# Patient Record
(demographics unavailable — no encounter records)

---

## 2017-08-22 NOTE — [facility]
PQRS Compliance Statement:

 

One or more of the following individualized dose reduction techniques were

utilized for this examination:  

1. Automated exposure control  

2. Adjustment of the mA and/or kV according to patient size  

3. Use of iterative reconstruction technique

 

 

CT MAXILLOFACIAL WO CONTRAST

 

Clinical Indication: Acute sinusitis, congestion, headache, nasal surgery.

 

Comparison: None.

 

TECHNIQUE: Helical CT imaging of the paranasal sinuses from the skull base

to skull vertex is performed without IV contrast using [employer] protocol.

 

Findings: 

The paranasal sinuses are clear. The mastoid air cells are aerated.

 

The bony nasal septum is mostly midline. The ostiomeatal complexes are 

patent.

 

Visualized brain without obvious midline shift or mass effect, study not 

protocoled for its evaluation. The globes and orbits are intact.

 

IMPRESSION:

Paranasal sinuses are clear.

 

Electronically signed by: Allan Sen MD (8/22/2017 11:33 AM) [id number]

## 2017-09-12 NOTE — [facility]
Examination: MRI right knee without contrast

 

HISTORY: History of pain in the right knee, crepitus, right knee pain in 

the anteromedial aspect

 

COMPARISON: None available 

 

Technique: Multiplanar, multisequence MR imaging of the right knee was 

performed without contrast.

 

 

 

FINDINGS:

 

The anterior cruciate ligament, posterior cruciate ligament are intact. 

The medial meniscus is intact. Lateral meniscus grossly intact.

 

The medial collateral ligament is intact and the lateral collateral 

ligamentous complex including the fibular collateral ligament, biceps 

femoris tendon, popliteus tendon appear intact.

 

The extensor mechanism is intact.

 

Small knee joint effusion is identified. The medial, lateral retinaculum 

are intact.

 

The cartilage in the weightbearing portion of the medial, lateral 

compartment grossly appears unremarkable.

 

There is mild fraying of cartilage in the patellofemoral compartment 

likely grade I chondromalacia. The evaluation of the patellofemoral 

compartment is somewhat limited due to motion artifact.

 

 

 

IMPRESSION:

 

1. Grade I chondromalacia patella.

 

2. Small knee joint effusion.

 

Electronically signed by: Adin Huerta MD (9/12/2017 4:53 PM) [id number]